# Patient Record
(demographics unavailable — no encounter records)

---

## 2017-10-11 NOTE — ED PDOC
Arrival/HPI





- General


Chief Complaint: GI Problem


Time Seen by Provider: 10/11/17 16:10


Historian: Patient





- History of Present Illness


Narrative History of Present Illness (Text): 





10/11/17 16:50


This 40 yo female with pmh gastritis, fibroid, s/p cholecystectomy, presents to 

this emergency department  complaining of rectal bleeding since this morning.  

Patient stated rectal bleeding appears as dark red in color.  Patient admits 

feeling malaise, tired for 3-4 days prior present symptoms.  Patient admits 

constipations for 3 months.  Patient denies shortness of breath, chest pain, 

nausea, vomiting, diarrhea, vaginal bleeding, dizziness, or abnormal gait.


Time/Duration: Other (see HPI)


Context: Home





Past Medical History





- Provider Review


Nursing Documentation Reviewed: Yes





- Infectious Disease


Hx of Infectious Diseases: None





- Tetanus Immunization


Tetanus Immunization: Unknown





- Reproductive


Menopause: No





- Pulmonary


Hx Respiratory Disorders: Yes


Hx Asthma: Yes (NEVER HOSPITALIZED)





- Hematological/Oncological


Hx Blood Disorders: Yes


Other/Comment: Lyme disease





- Musculoskeletal/Rheumatological


Hx Musculoskeletal Disorders: Yes


Hx Arthritis: Yes (LEFT KNEE)


Other/Comment: scoliosis





- Gastrointestinal


Hx Gastrointestinal Disorders: Yes


Hx Gall Bladder Disease: Yes


Hx Gastritis: Yes





- Genitourinary/Gynecological


Hx Genitourinary Disorders: Yes


Hx Reproductive Disorders: Yes (UTERINE LEIOMYOMA)


Hx Urinary Tract Infection: Yes


Other/Comment: endometriosis





- Psychiatric


Hx Substance Use: No





- Surgical History


Hx Cholecystectomy: Yes ([facility] in [place] [facility])





- Anesthesia


Hx Anesthesia: Yes


Hx Anesthesia Reactions: Yes (NAUSEA)


Hx Malignant Hyperthermia: No





- Suicidal Assessment


Feels Threatened In Home Enviroment: No





Family/Social History





- Physician Review


Nursing Documentation Reviewed: Yes


Family/Social History: Other (noncontributory)


Smoking Status: Never Smoked


Hx Alcohol Use: Yes


Frequency of alcohol use: Socially


Hx Substance Use: No


Hx Substance Use Treatment: No





Allergies/Home Meds


Allergies/Adverse Reactions: 


Allergies





ciprofloxacin [From Cipro] Allergy (Severe, Verified 10/11/17 16:17)


 ANAPHYLAXIS


ciprofloxacin HCl [From Cipro] Allergy (Severe, Verified 10/11/17 16:17)


 ANAPHYLAXIS


doxycycline Allergy (Severe, Verified 04/27/17 08:46)


 ANAPHYLAXIS


nut - unspecified Allergy (Severe, Verified 04/27/17 08:44)


 ANAPHYLAXIS


adhesive tape Allergy (Intermediate, Verified 10/11/17 16:17)


 RASH


amoxicillin Allergy (Intermediate, Verified 10/11/17 16:17)


 URTICARIA


cefuroxime Allergy (Intermediate, Verified 10/11/17 16:17)


 URTICARIA


erythromycin base Allergy (Intermediate, Verified 04/27/17 08:45)


 RASH


nitrofurantoin [From Macrobid] Allergy (Intermediate, Verified 10/11/17 16:17)


 RASH


nitrofurantoin macrocrystalline [From Macrobid] Allergy (Intermediate, Verified 

10/11/17 16:17)


 RASH


Penicillins Allergy (Intermediate, Verified 10/11/17 16:17)


 RASH


sulfamethoxazole [From Bactrim] Allergy (Intermediate, Verified 10/11/17 16:17)


 ITCHING


trimethoprim [From Bactrim] Allergy (Intermediate, Verified 04/27/17 08:47)


 ITCHING


spermicide Allergy (Uncoded 07/26/15 20:11)


 RASH








Home Medications: 


 Home Meds











 Medication  Instructions  Recorded  Confirmed


 


Albuterol Sulfate [Proventil Hfa] 2 puff IH Q6 PRN 04/27/17 10/11/17














Review of Systems





- Review of Systems


Constitutional: Other (see HPI).  absent: Fatigue, Weight Change, Fevers, Night 

Sweats


Eyes: Normal


ENT: Normal.  absent: Sore Throat, Rhinorrhea


Respiratory: Normal.  absent: SOB, Cough, Sputum, Wheezing


Cardiovascular: Normal.  absent: Chest Pain, Palpitations


Gastrointestinal: Abdominal Pain (epigastric), Constipation, Other (rectal 

bleeding).  absent: Diarrhea, Nausea, Vomiting


Genitourinary Female: Normal.  absent: Dysuria, Frequency, Hematuria, Vaginal 

Bleeding, Vaginal Discharge


Musculoskeletal: Normal.  absent: Back Pain


Skin: Normal.  absent: Rash


Neurological: Normal.  absent: Headache, Dizziness


Endocrine: Normal


Hemo/Lymphatic: Normal


Psychiatric: Normal





Physical Exam


Vital Signs











  Temp Pulse Resp BP Pulse Ox


 


 10/11/17 21:43   57 L  18  119/76  100


 


 10/11/17 16:13  98.7 F  94 H  16  116/82  98











Temperature: Afebrile


Blood Pressure: Normal


Pulse: Regular


Respiratory Rate: Normal


Appearance: Positive for: Well-Appearing, Non-Toxic, Comfortable


Pain Distress: None


Mental Status: Positive for: Alert and Oriented X 3





- Systems Exam


Head: Present: Atraumatic, Normocephalic


Pupils: Present: PERRL


Extroacular Muscles: Present: EOMI


Conjunctiva: Present: Normal


Mouth: Present: Moist Mucous Membranes


Neck: Present: Normal Range of Motion


Respiratory/Chest: Present: Clear to Auscultation, Good Air Exchange.  No: 

Respiratory Distress, Accessory Muscle Use


Cardiovascular: Present: Regular Rate and Rhythm, Normal S1, S2.  No: Murmurs


Abdomen: Present: Tenderness, Normal Bowel Sounds.  No: Distention, Peritoneal 

Signs, Rebound, Guarding


Rectal: Present: Normal Rectal Tone, Other (GUAIAC negative with positive 

control).  No: Occult Blood, Rectal Tenderness, Gross Blood, Melena, Hemorrhoids

, Fissures, Nodule/Mass/Lesions


Back: Present: Normal Inspection.  No: CVA Tenderness


Upper Extremity: Present: Normal Inspection, Normal ROM, NORMAL PULSES, 

Neurovascularly Intact, Capillary Refill < 2s.  No: Cyanosis, Edema


Lower Extremity: Present: Normal Inspection, NORMAL PULSES, Normal ROM, 

Neurovascularly Intact, Capillary Refill < 2 s.  No: Edema, CALF TENDERNESS


Neurological: Present: GCS=15, CN II-XII Intact, Speech Normal, Motor Func 

Grossly Intact, Normal Sensory Function, Normal Cerebellar Funct, Gait Normal


Skin: Present: Warm, Dry, Normal Color.  No: Rashes


Psychiatric: Present: Alert, Oriented x 3, Normal Insight, Normal Concentration





Medical Decision Making


ED Course and Treatment: 





10/11/17 22:21


Patient is resting comfortably, in no distress, abdomen is soft, no rebound or 

guarding, and is tolerating PO. Patient has no signs or symptoms to suggest 

surgical pathology. Patient was advised to follow up without fail with 

physician or to return to the ER for reevaluation in 1-2 days.


patient was recommended to follow up Dr. Gutierrez GYN in 1-2 days. Patient was 

recommended to get a mammogram for her right breast and an ultrasound of the 

pelvis as recommended by radiologist.  In addition, patient was recommended to 

follow-up with private doctor for reevaluation of finding of the fever. You may 

need to have a dedicated CAT scan of the liver or MRI of the liver.  Patient 

understood the importance of the recommendation and she agrees to them.


Patient understood plan.  Mother is also aware of plan.


Re-evaluation Time: 22:24


Reassessment Condition: Re-examined, Improved





- Lab Interpretations


Lab Results: 








 10/11/17 18:27 





 10/11/17 18:27 





 Lab Results





10/11/17 18:56: Urine Color Yellow, Urine Appearance Clear, Urine pH 6.5, Ur 

Specific Gravity 1.020, Urine Protein Negative, Urine Glucose (UA) Negative, 

Urine Ketones Negative, Urine Blood Negative, Urine Nitrate Negative, Urine 

Bilirubin Negative, Urine Urobilinogen 0.2, Ur Leukocyte Esterase Small H, 

Urine RBC 0 - 2, Urine WBC 0 - 2, Ur Epithelial Cells 0 - 2, Urine Bacteria Occ

, Urine HCG, Qual Negative


10/11/17 18:27: Sodium 139, Potassium 4.4, Chloride 103, Carbon Dioxide 24, 

Anion Gap 16, BUN 6 L, Creatinine 0.8, Est GFR (African Amer) > 60, Est GFR (Non

-Af Amer) > 60, Random Glucose 84, Calcium 9.4, Total Bilirubin 0.6, AST 33, 

ALT 20, Alkaline Phosphatase 75, Total Protein 8.7 H, Albumin 4.8, Globulin 3.9

, Albumin/Globulin Ratio 1.2, Lipase 78


10/11/17 18:27: PT 11.1, INR 1.03, APTT 26.3


10/11/17 18:27: WBC 9.1  D, RBC 4.79, Hgb 11.9 L, Hct 37.5, MCV 78.3 L, MCH 

24.8 L, MCHC 31.7, RDW 19.2 H, Plt Count 339, MPV 10.8, Gran % 52.2, Lymph % (

Auto) 37.3 H, Mono % (Auto) 8.9 H, Eos % (Auto) 1.1 L, Baso % (Auto) 0.5, Gran 

# 4.75, Lymph # 3.4, Mono # 0.8 H, Eos # 0.1, Baso # 0.05








I have reviewed the lab results: Yes


Interpretation: No clinic. lab abnormalty





- RAD Interpretation


Narrative RAD Interpretations (Text): 





10/11/17 21:58


[facility]


Final Radiology Report 24/7/365 Call: [phone]


assistance Online chat: [email or web address]


Patient Name: DEVENDRA AHUMADA


Institution Name: [place] [address]


Study Type: CT ABDOMEN/PELVIS W


Ordered As: CT ABD PELVIS IV CONTRAST ONLY


Date of Dictation: 11 Oct 2017 EDT Accession: [id number]


Date of Exam: 11 Oct 2017 EDT Account Number:





FINDINGS:


LIMITATIONS: Mild streak/motion artifact.


LOWER THORAX: Findings suspicious for a cluster of small nodules in the right 

breast, images 14-


17 of series 2. Recommend further evaluation with mammography and/or breast 

ultrasound, on a


nonemergent basis.


ABDOMEN:


LIVER: 2.9 x 1.8 cm low-density lesion in the liver, which does not appear to 

represent a cyst. This


has an irregular shape. This is indeterminate in nature. This lesion could be 

further evaluated with


followup liver protocol CT or MRI, as clinically indicated, on a nonemergent 

basis.


GALLBLADDER AND BILE DUCTS: Cholecystectomy clips. No evidence of significant 

biliary ductal


dilatation.





PANCREAS: No CT evidence of acute pancreatitis.


SPLEEN: No acute abnormality of the spleen identified.


ADRENALS: No acute abnormality of the adrenal glands identified.


KIDNEYS AND URETERS: No acute abnormality of the kidneys identified. No 

evidence of


significant hydrouereteronephrosis.


STOMACH AND BOWEL: No acute abnormality of the stomach, small bowel or colon 

identified. No


evidence of bowel obstruction.


APPENDIX: Normal appendix is probably seen, images 120-130 series 2. No 

significant pericecal


inflammatory changes are noted to suggest appendicitis. Recommend clinical 

correlation.


PELVIS:


BLADDER: No acute abnormality of the bladder identified.


REPRODUCTIVE: 5.3 cm right ovarian cystic lesion. This is suspected to be a 

mildly complex cyst.


It has a density compatible with simple fluid, but it has small areas of 

hyperdensity within it


posteriorly. Followup pelvic ultrasound is recommended for further evaluation. 

Mildly heterogeneous


enhancement of the uterus, most likely secondary to fibroids.


ABDOMEN and PELVIS:


INTRAPERITONEAL SPACE: Small amount of free fluid in the cul-de-sac. No 

evidence of free air.


BONES/JOINTS: Scoliotic curvature of the spine.


SOFT TISSUES: No acute abnormality of the visualized soft tissues is seen.


VASCULATURE: No evidence of abdominal aortic aneurysm. No evidence of periaortic


hemorrhage.


LYMPH NODES: No evidence of diffuse lymphadenopathy.


IMPRESSION:


- No evidence of significant acute process. No definite cause for epigastric 

pain identified.


- Small amount of free fluid in the cul-de-sac. This may be physiologic in 

nature.


- 5.3 cm right ovarian cystic lesion, for which followup pelvic ultrasound is 

recommended, not


necessarily on an emergent basis.


- Incidental indeterminate liver lesion. See recommendations above.


- Incidental right breast lesions, as described. See recommendations above.


- See above for remaining findings.


Thank you for allowing us to participate in the care of your patient.


Dictated and Authenticated by: Radha Church MD


10/11/2017 9:54 PM Eastern Time ([place] & [place])


Radiology Orders: 








10/11/17 19:51


ABD & PELVIS IV CONTRAST ONLY [CT] Stat 














- Medication Orders


Current Medication Orders: 











Discontinued Medications





Sodium Chloride (Sodium Chloride 0.9%)  1,000 mls @ 1,000 mls/hr IV .Q1H STA


   Stop: 10/11/17 18:01


   Last Admin: 10/11/17 17:54  Dose: 1,000 mls/hr





eMAR Start Stop


 Document     10/11/17 17:54  OCS  (Rec: 10/11/17 17:55  OCS  [facility])


     Intravenous Solution


      Start Date                                 10/11/17


      Start Time                                 17:55





Pantoprazole Sodium (Protonix Inj)  40 mg IVP STAT STA


   Stop: 10/11/17 17:03


   Last Admin: 10/11/17 17:54  Dose: 40 mg





IVP Administration


 Document     10/11/17 17:54  OCS  (Rec: 10/11/17 17:54  OCS  [facility])


     Charges for Administration


      # of IVP Administrations                   1














Disposition/Present on Arrival





- Present on Arrival


Any Indicators Present on Arrival: No


History of DVT/PE: No


History of Uncontrolled Diabetes: No


Urinary Catheter: No


History of Decub. Ulcer: No


History Surgical Site Infection Following: None





- Disposition


Have Diagnosis and Disposition been Completed?: Yes


Diagnosis: 


 Epigastric abdominal pain, Abnormal finding on CT scan, Liver lesion





Disposition Time: 22:25


Patient Plan: Discharge


Patient Problems: 


 Current Active Problems











Problem Status Onset


 


Abnormal finding on CT scan Acute  


 


Epigastric abdominal pain Acute  


 


Liver lesion Acute  











Condition: IMPROVED


Additional Instructions: 


Call Private doctor for revaluation in 1-2 days due to liver lesion.  


You will need to have MRI or dedicated CT scan of the liver


Call GYN doctor for further revaluation of right breast lesions, and ovarian 

cyst lesion.  


You will need to have ultrasound of pelvis, and mammography.


Return to emergency immediately if rectal bleeding returns, or worsen of 

abdominal pain, or if new symptoms appears.





 PRELIMINARY IMPRESSION:


- No evidence of significant acute process. No definite cause for epigastric 

pain identified.


- Small amount of free fluid in the cul-de-sac. This may be physiologic in 

nature.


- 5.3 cm right ovarian cystic lesion, for which followup pelvic ultrasound is 

recommended, not


necessarily on an emergent basis.


- Incidental indeterminate liver lesion. See recommendations above.


- Incidental right breast lesions, as described. See recommendations above.


- See above for remaining findings.


Thank you for allowing us to participate in the care of your patient.


Dictated and Authenticated by: Radha Church MD


10/11/2017 9:54 PM Eastern Time ([place] & [place])


Prescriptions: 


Esomeprazole Magnesium [Nexium] 40 mg PO DAILY #20 cap


Referrals: 


[facility] Y[facility] Repramod, [Primary Care Provider] - Follow up with primary


Yamilet Gutierrez MD [Medical Doctor] - Follow up with primary


Forms:  [employer] (English), WORK NOTE

## 2017-10-11 NOTE — CT
EXAM:

  CT Abdomen and Pelvis With Intravenous Contrast



EXAM DATE/TIME:

  10/11/2017 7:51 PM



CLINICAL HISTORY:

  39 years old, female; Signs and symptoms; Other: Blood in stool; Prior 

surgery; Surgery date: 1-6 months; Surgery type: Fibroid surgery 7/17. HX of gb 

surgery; Additional info: Epigastric pain



TECHNIQUE:

  Axial computed tomography images of the abdomen and pelvis with intravenous 

contrast.  All CT scans at this facility use one or more dose reduction 

techniques, viz.: automated exposure control; ma/kV adjustment per patient size 

(including targeted exams where dose is matched to indication; i.e. head); or 

iterative reconstruction technique.

  Coronal and sagittal reformatted images were created and reviewed.



CONTRAST:

  95 mL of omni 350 administered intravenously.



COMPARISON:

  No relevant prior studies available.



FINDINGS:

  LIMITATIONS:  Mild streak/motion artifact.

  LOWER THORAX:  Findings suspicious for a cluster of small nodules in the 

right breast, images 14-17 of series 2.  Recommend further evalation with 

mammography and/or breast ultrasound, on a nonemergent basis.



 ABDOMEN:

  LIVER:  2.9 x 1.8 cm low-density lesion in the liver, which does not appear 

to represent a cyst. This has an irregular shape. This is indeterminate in 

nature. This lesion could be further evaluated with followup liver protocol CT 

or MRI, as clinically indicated, on a nonemergent basis.

  GALLBLADDER AND BILE DUCTS:  Cholecystectomy clips. No evidence of 

significant biliary ductal dilatation. 

  PANCREAS:  No CT evidence of acute pancreatitis.

  SPLEEN:  No acute abnormality of the spleen identified.

  ADRENALS:  No acute abnormality of the adrenal glands identified.

  KIDNEYS AND URETERS:  No acute abnormality of the kidneys identified. No 

evidence of significant hydrouereteronephrosis.

  STOMACH AND BOWEL:  No acute abnormality of the stomach, small bowel or colon 

identified. No evidence of bowel obstruction.

  APPENDIX:  Normal appendix is probably seen, images 120-130 series 2. No 

significant pericecal inflammatory changes are noted to suggest appendicitis. 

Recommend clinical correlation.



 PELVIS:

  BLADDER:  No acute abnormality of the bladder identified.

  REPRODUCTIVE:  5.3 cm right ovarian cystic lesion. This is suspected to be a 

mildly complex cyst. It has a density compatible with simple fluid, but it has 

small areas of hyperdensity within it posteriorly.  Followup pelvic ultrasound 

is recommended for further evaluation.  Mildly heterogeneous enhancement of the 

uterus, most likely secondary to fibroids.



 ABDOMEN and PELVIS:

  INTRAPERITONEAL SPACE:  Small amount of free fluid in the cul-de-sac. No 

evidence of free air.

  BONES/JOINTS:  Scoliotic curvature of the spine.

  SOFT TISSUES:  No acute abnormality of the visualized soft tissues is seen.

  VASCULATURE:  No evidence of abdominal aortic aneurysm. No evidence of 

periaortic hemorrhage.

  LYMPH NODES:  No evidence of diffuse lymphadenopathy.



IMPRESSION:     

- No evidence of significant acute process.  No definite cause for epigastric 

pain identified.

- Small amount of free fluid in the cul-de-sac.  This may be physiologic in 

nature.

- 5.3 cm right ovarian cystic lesion, for which followup pelvic ultrasound is 

recommended, not necessarily on an emergent basis.

- Incidental indeterminate liver lesion.  See recommendations above.

- Incidental right breast lesions, as described. See recommendations above.

- See above for remaining findings.